# Patient Record
Sex: FEMALE | Race: WHITE | Employment: OTHER | URBAN - METROPOLITAN AREA
[De-identification: names, ages, dates, MRNs, and addresses within clinical notes are randomized per-mention and may not be internally consistent; named-entity substitution may affect disease eponyms.]

---

## 2019-07-23 ENCOUNTER — HOSPITAL ENCOUNTER (OUTPATIENT)
Dept: PET IMAGING | Age: 74
Discharge: HOME | End: 2019-07-23
Attending: NURSE PRACTITIONER
Payer: MEDICARE

## 2019-07-23 DIAGNOSIS — R91.8 OTHER NONSPECIFIC ABNORMAL FINDING OF LUNG FIELD: ICD-10-CM

## 2019-07-23 PROCEDURE — A9552 F18 FDG: HCPCS

## 2019-07-23 RX ORDER — MINERAL OIL
180 ENEMA (ML) RECTAL
COMMUNITY

## 2019-07-23 RX ORDER — CYCLOBENZAPRINE HCL 5 MG
5 TABLET ORAL
COMMUNITY

## 2019-07-23 RX ORDER — HYDROCODONE BITARTRATE AND ACETAMINOPHEN 5; 300 MG/1; MG/1
TABLET ORAL
COMMUNITY

## 2019-07-23 RX ORDER — BENZOCAINE .13; .15; .5; 2 G/100G; G/100G; G/100G; G/100G
2 GEL ORAL DAILY
COMMUNITY

## 2019-07-23 RX ORDER — AZITHROMYCIN 250 MG/1
250 TABLET, FILM COATED ORAL DAILY
Status: ON HOLD | COMMUNITY
End: 2019-07-31

## 2019-07-23 RX ORDER — ALBUTEROL SULFATE 90 UG/1
2 AEROSOL, METERED RESPIRATORY (INHALATION)
COMMUNITY

## 2019-07-23 NOTE — PROGRESS NOTES
PT aware of NPO status. PT no on anticoags. Takes rare naprosyn but will not take any prior to procedure. PT aware of potential for sedation administration and need for  at discharge. PT aware of arrival time pre procedure. Will arrive at [address] [place] [address] for 1015 procedure. Pt states no questions at this time.

## 2019-07-30 NOTE — PROGRESS NOTES
Patient called with questions and returned call. Discussed plan for tomorrow, location and recovery as patient lost notes she had written.

## 2019-07-31 ENCOUNTER — APPOINTMENT (OUTPATIENT)
Dept: GENERAL RADIOLOGY | Age: 74
End: 2019-07-31
Attending: RADIOLOGY
Payer: MEDICARE

## 2019-07-31 ENCOUNTER — HOSPITAL ENCOUNTER (OUTPATIENT)
Dept: CT IMAGING | Age: 74
Discharge: HOME | End: 2019-07-31
Attending: NURSE PRACTITIONER | Admitting: SPECIALIST
Payer: MEDICARE

## 2019-07-31 VITALS
TEMPERATURE: 97.7 F | BODY MASS INDEX: 19.81 KG/M2 | DIASTOLIC BLOOD PRESSURE: 70 MMHG | WEIGHT: 116 LBS | OXYGEN SATURATION: 97 % | HEART RATE: 84 BPM | RESPIRATION RATE: 16 BRPM | SYSTOLIC BLOOD PRESSURE: 124 MMHG | HEIGHT: 64 IN

## 2019-07-31 DIAGNOSIS — R91.8 OTHER NONSPECIFIC ABNORMAL FINDING OF LUNG FIELD: ICD-10-CM

## 2019-07-31 LAB
ANION GAP SERPL CALC-SCNC: 9 MMOL/L (ref 3–18)
APTT PPP: 24.8 SEC (ref 23–36.4)
BASOPHILS # BLD: 0.1 K/UL (ref 0–0.1)
BASOPHILS NFR BLD: 1 % (ref 0–2)
BUN SERPL-MCNC: 16 MG/DL (ref 7–18)
BUN/CREAT SERPL: 27 (ref 12–20)
CALCIUM SERPL-MCNC: 9.2 MG/DL (ref 8.5–10.1)
CHLORIDE SERPL-SCNC: 107 MMOL/L (ref 100–111)
CO2 SERPL-SCNC: 27 MMOL/L (ref 21–32)
CREAT SERPL-MCNC: 0.59 MG/DL (ref 0.6–1.3)
DIFFERENTIAL METHOD BLD: NORMAL
EOSINOPHIL # BLD: 0.1 K/UL (ref 0–0.4)
EOSINOPHIL NFR BLD: 1 % (ref 0–5)
ERYTHROCYTE [DISTWIDTH] IN BLOOD BY AUTOMATED COUNT: 14.3 % (ref 11.6–14.5)
GLUCOSE SERPL-MCNC: 108 MG/DL (ref 74–99)
HCT VFR BLD AUTO: 43.9 % (ref 35–45)
HGB BLD-MCNC: 14.5 G/DL (ref 12–16)
INR PPP: 0.9 (ref 0.8–1.2)
LYMPHOCYTES # BLD: 2 K/UL (ref 0.9–3.6)
LYMPHOCYTES NFR BLD: 24 % (ref 21–52)
MCH RBC QN AUTO: 31.8 PG (ref 24–34)
MCHC RBC AUTO-ENTMCNC: 33 G/DL (ref 31–37)
MCV RBC AUTO: 96.3 FL (ref 74–97)
MONOCYTES # BLD: 0.7 K/UL (ref 0.05–1.2)
MONOCYTES NFR BLD: 9 % (ref 3–10)
NEUTS SEG # BLD: 5.4 K/UL (ref 1.8–8)
NEUTS SEG NFR BLD: 65 % (ref 40–73)
PLATELET # BLD AUTO: 299 K/UL (ref 135–420)
PMV BLD AUTO: 9.7 FL (ref 9.2–11.8)
POTASSIUM SERPL-SCNC: 3.6 MMOL/L (ref 3.5–5.5)
PROTHROMBIN TIME: 12.1 SEC (ref 11.5–15.2)
RBC # BLD AUTO: 4.56 M/UL (ref 4.2–5.3)
SODIUM SERPL-SCNC: 143 MMOL/L (ref 136–145)
WBC # BLD AUTO: 8.3 K/UL (ref 4.6–13.2)

## 2019-07-31 PROCEDURE — 88360 TUMOR IMMUNOHISTOCHEM/MANUAL: CPT

## 2019-07-31 PROCEDURE — 74011250636 HC RX REV CODE- 250/636: Performed by: RADIOLOGY

## 2019-07-31 PROCEDURE — 99153 MOD SED SAME PHYS/QHP EA: CPT

## 2019-07-31 PROCEDURE — 88341 IMHCHEM/IMCYTCHM EA ADD ANTB: CPT

## 2019-07-31 PROCEDURE — 88305 TISSUE EXAM BY PATHOLOGIST: CPT

## 2019-07-31 PROCEDURE — 88334 PATH CONSLTJ SURG CYTO XM EA: CPT

## 2019-07-31 PROCEDURE — 71045 X-RAY EXAM CHEST 1 VIEW: CPT

## 2019-07-31 PROCEDURE — 74011250636 HC RX REV CODE- 250/636

## 2019-07-31 PROCEDURE — 88374 M/PHMTRC ALYS ISHQUANT/SEMIQ: CPT

## 2019-07-31 PROCEDURE — 88377 M/PHMTRC ALYS ISHQUANT/SEMIQ: CPT

## 2019-07-31 PROCEDURE — 88342 IMHCHEM/IMCYTCHM 1ST ANTB: CPT

## 2019-07-31 PROCEDURE — 80048 BASIC METABOLIC PNL TOTAL CA: CPT

## 2019-07-31 PROCEDURE — 88333 PATH CONSLTJ SURG CYTO XM 1: CPT

## 2019-07-31 PROCEDURE — 85025 COMPLETE CBC W/AUTO DIFF WBC: CPT

## 2019-07-31 PROCEDURE — 85610 PROTHROMBIN TIME: CPT

## 2019-07-31 PROCEDURE — 99152 MOD SED SAME PHYS/QHP 5/>YRS: CPT

## 2019-07-31 PROCEDURE — 85730 THROMBOPLASTIN TIME PARTIAL: CPT

## 2019-07-31 PROCEDURE — 32405 CT BX LUNG LT: CPT

## 2019-07-31 RX ORDER — FLUMAZENIL 0.1 MG/ML
0.2 INJECTION INTRAVENOUS
Status: DISCONTINUED | OUTPATIENT
Start: 2019-07-31 | End: 2019-07-31 | Stop reason: HOSPADM

## 2019-07-31 RX ORDER — LIDOCAINE HYDROCHLORIDE 10 MG/ML
1-20 INJECTION INFILTRATION; PERINEURAL
Status: DISCONTINUED | OUTPATIENT
Start: 2019-07-31 | End: 2019-07-31 | Stop reason: HOSPADM

## 2019-07-31 RX ORDER — FLUMAZENIL 0.1 MG/ML
INJECTION INTRAVENOUS
Status: DISCONTINUED
Start: 2019-07-31 | End: 2019-07-31 | Stop reason: WASHOUT

## 2019-07-31 RX ORDER — MIDAZOLAM HYDROCHLORIDE 1 MG/ML
.5-4 INJECTION, SOLUTION INTRAMUSCULAR; INTRAVENOUS
Status: DISCONTINUED | OUTPATIENT
Start: 2019-07-31 | End: 2019-07-31 | Stop reason: HOSPADM

## 2019-07-31 RX ORDER — FENTANYL CITRATE 50 UG/ML
INJECTION, SOLUTION INTRAMUSCULAR; INTRAVENOUS
Status: COMPLETED
Start: 2019-07-31 | End: 2019-07-31

## 2019-07-31 RX ORDER — LIDOCAINE HYDROCHLORIDE 10 MG/ML
INJECTION INFILTRATION; PERINEURAL
Status: DISCONTINUED
Start: 2019-07-31 | End: 2019-07-31 | Stop reason: HOSPADM

## 2019-07-31 RX ORDER — NALOXONE HYDROCHLORIDE 0.4 MG/ML
INJECTION, SOLUTION INTRAMUSCULAR; INTRAVENOUS; SUBCUTANEOUS
Status: DISCONTINUED
Start: 2019-07-31 | End: 2019-07-31 | Stop reason: WASHOUT

## 2019-07-31 RX ORDER — MIDAZOLAM HYDROCHLORIDE 1 MG/ML
INJECTION, SOLUTION INTRAMUSCULAR; INTRAVENOUS
Status: COMPLETED
Start: 2019-07-31 | End: 2019-07-31

## 2019-07-31 RX ORDER — FENTANYL CITRATE 50 UG/ML
25-200 INJECTION, SOLUTION INTRAMUSCULAR; INTRAVENOUS
Status: DISCONTINUED | OUTPATIENT
Start: 2019-07-31 | End: 2019-07-31 | Stop reason: HOSPADM

## 2019-07-31 RX ORDER — SODIUM CHLORIDE 9 MG/ML
25 INJECTION, SOLUTION INTRAVENOUS CONTINUOUS
Status: DISCONTINUED | OUTPATIENT
Start: 2019-07-31 | End: 2019-07-31 | Stop reason: HOSPADM

## 2019-07-31 RX ORDER — NALOXONE HYDROCHLORIDE 0.4 MG/ML
0.1 INJECTION, SOLUTION INTRAMUSCULAR; INTRAVENOUS; SUBCUTANEOUS AS NEEDED
Status: DISCONTINUED | OUTPATIENT
Start: 2019-07-31 | End: 2019-07-31 | Stop reason: HOSPADM

## 2019-07-31 RX ADMIN — MIDAZOLAM HYDROCHLORIDE 1 MG: 1 INJECTION, SOLUTION INTRAMUSCULAR; INTRAVENOUS at 10:54

## 2019-07-31 RX ADMIN — MIDAZOLAM HYDROCHLORIDE 0.5 MG: 1 INJECTION, SOLUTION INTRAMUSCULAR; INTRAVENOUS at 11:08

## 2019-07-31 RX ADMIN — LIDOCAINE HYDROCHLORIDE 5 ML: 10 INJECTION, SOLUTION INFILTRATION; PERINEURAL at 11:19

## 2019-07-31 RX ADMIN — MIDAZOLAM HYDROCHLORIDE 0.5 MG: 1 INJECTION, SOLUTION INTRAMUSCULAR; INTRAVENOUS at 11:13

## 2019-07-31 RX ADMIN — FENTANYL CITRATE 50 MCG: 50 INJECTION, SOLUTION INTRAMUSCULAR; INTRAVENOUS at 10:54

## 2019-07-31 RX ADMIN — FENTANYL CITRATE 25 MCG: 50 INJECTION, SOLUTION INTRAMUSCULAR; INTRAVENOUS at 11:08

## 2019-07-31 RX ADMIN — FENTANYL CITRATE 25 MCG: 50 INJECTION, SOLUTION INTRAMUSCULAR; INTRAVENOUS at 11:13

## 2019-07-31 NOTE — PROGRESS NOTES
TRANSFER - OUT REPORT:    Verbal report given to Karina Lynn RN(name) on Roopa Jose  being transferred to Care Unit(unit) for routine progression of care       Report consisted of patients Situation, Background, Assessment and   Recommendations(SBAR). Returned to care unit following chest xray. Information from the following report(s) Kardex, Procedure Summary and MAR was reviewed with the receiving nurse. Lines:   Peripheral IV 07/31/19 Anterior;Distal;Right Forearm (Active)        Opportunity for questions and clarification was provided.       Patient transported with:   Registered Nurse following cxr

## 2019-07-31 NOTE — PROGRESS NOTES
The patient is an appropriate candidate to undergo CT guided left lung biopsy    Patient assessed immediately prior to induction. Anesthesia plan as follows: Moderate sedation    History and Physical update:  H&P was reviewed and the patient was examined. No changes have occurred in the patient's condition since the H&P was completed.     Karthik Cooper MD  Vascular & Interventional Radiology  [facility]  7/31/2019

## 2019-07-31 NOTE — DISCHARGE INSTRUCTIONS
Patient Education     DISCHARGE SUMMARY from Nurse    PATIENT INSTRUCTIONS:    After general anesthesia or intravenous sedation, for 24 hours or while taking prescription Narcotics:  · Limit your activities  · Do not drive and operate hazardous machinery  · Do not make important personal or business decisions  · Do  not drink alcoholic beverages  · If you have not urinated within 8 hours after discharge, please contact your surgeon on call. Report the following to your surgeon:  · Excessive pain, swelling, redness or odor of or around the surgical area  · Temperature over 100.5  · Nausea and vomiting lasting longer than 4 hours or if unable to take medications  · Any signs of decreased circulation or nerve impairment to extremity: change in color, persistent  numbness, tingling, coldness or increase pain  · Any questions    What to do at Home:  Recommended activity: Activity as tolerated and no driving for today,     *  Please give a list of your current medications to your Primary Care Provider. *  Please update this list whenever your medications are discontinued, doses are      changed, or new medications (including over-the-counter products) are added. *  Please carry medication information at all times in case of emergency situations. These are general instructions for a healthy lifestyle:    No smoking/ No tobacco products/ Avoid exposure to second hand smoke  Surgeon General's Warning:  Quitting smoking now greatly reduces serious risk to your health.     Obesity, smoking, and sedentary lifestyle greatly increases your risk for illness    A healthy diet, regular physical exercise & weight monitoring are important for maintaining a healthy lifestyle    You may be retaining fluid if you have a history of heart failure or if you experience any of the following symptoms:  Weight gain of 3 pounds or more overnight or 5 pounds in a week, increased swelling in our hands or feet or shortness of breath while lying flat in bed. Please call your doctor as soon as you notice any of these symptoms; do not wait until your next office visit. The discharge information has been reviewed with the patient. The patient verbalized understanding. Discharge medications reviewed with the patient and appropriate educational materials and side effects teaching were provided. ___________________________________________________________________________________________________________________________________     Percutaneous Lung Biopsy: What to Expect at [address] [place] [address]    A percutaneous (say \"per-kew-MARY-nee-us) lung biopsy is a procedure to take a sample (biopsy) of lung tissue. The doctor puts a long needle through your chest wall to get the sample. Another doctor will look at the lung tissue with a microscope to check for infection, cancer, or other lung problems. This procedure is also called a needle biopsy. You may be sore where the doctor made the cut (incision) in your skin and put in the biopsy needle. You may feel some pain in your lung when you take a deep breath. These symptoms usually get better in a few days. If you cough up mucus, there may be streaks of blood in the mucus for the first week after the procedure. You may need to take it easy at home for a day or two after the procedure. For 1 week, try to avoid heavy lifting and strenuous activities. These activities could cause bleeding from the biopsy site. It can take several days to get the results of the biopsy. The doctor or nurse will discuss the results with you. This care sheet gives you a general idea about how long it will take for you to recover. But each person recovers at a different pace. Follow the steps below to feel better as quickly as possible. How can you care for yourself at home? Activity    · Rest when you feel tired. Getting enough sleep will help you recover.     · Try to walk each day.  Start by walking a little more than you did the day before. Bit by bit, increase the amount you walk. Walking boosts blood flow and helps prevent pneumonia and constipation.     · Avoid strenuous activities, such as bicycle riding, jogging, weight lifting, or aerobic exercise, for 1 week or until your doctor says it is okay.     · For 1 week, avoid lifting anything that would make you strain. This may include a child, heavy grocery bags and milk containers, a heavy briefcase or backpack, cat litter or dog food bags, or a vacuum .     · Ask your doctor when you can drive again.     · You may need to take 1 or 2 days off from work. It depends on the type of work you do and how you feel.     · You may shower 1 or 2 days after the procedure, if your doctor says it is okay. Pat the incision dry. Do not take a bath for the first week, or until your doctor tells you it is okay.     · Do not fly in an airplane or dive deeply (such as in scuba diving) until your doctor tells you it is okay. Avoid any situations where there is increased air pressure. Diet    · You can eat your normal diet. If your stomach is upset, try bland, low-fat foods like plain rice, broiled chicken, toast, and yogurt. Medicines    · Your doctor will tell you if and when you can restart your medicines. He or she will also give you instructions about taking any new medicines.     · If you take blood thinners, such as warfarin (Coumadin), clopidogrel (Plavix), or aspirin, be sure to talk to your doctor. He or she will tell you if and when to start taking those medicines again. Make sure that you understand exactly what your doctor wants you to do.     · Be safe with medicines. Take pain medicines exactly as directed. ? If the doctor gave you a prescription medicine for pain, take it as prescribed.   ? If you are not taking a prescription pain medicine, ask your doctor if you can take an over-the-counter medicine.     · If you think your pain medicine is making you sick to your stomach:  ? Take your medicine after meals (unless your doctor has told you not to). ? Ask your doctor for a different pain medicine.     · If your doctor prescribed antibiotics, take them as directed. Do not stop taking them just because you feel better. You need to take the full course of antibiotics. Incision care    · If you have strips of tape on the incision, leave the tape on for a week or until it falls off.     · Wash the area daily with warm, soapy water and pat it dry. Don't use hydrogen peroxide or alcohol, which can slow healing. You may cover the area with a gauze bandage if it weeps or rubs against clothing. Change the bandage every day.     · Keep the area clean and dry. Follow-up care is a key part of your treatment and safety. Be sure to make and go to all appointments, and call your doctor if you are having problems. It's also a good idea to know your test results and keep a list of the medicines you take. When should you call for help? Call 911 anytime you think you may need emergency care. For example, call if:    · You passed out (lost consciousness).     · You have severe trouble breathing.     · You have sudden chest pain and shortness of breath, or you cough up blood.    Call your doctor now or seek immediate medical care if:    · You are sick to your stomach or cannot keep fluids down.     · You have pain that does not get better after you take pain medicine.     · You have a fever over 100°F.     · You have signs of infection, such as:  ? Increased pain, swelling, warmth, or redness. ? Red streaks leading from the incision. ? Pus draining from the incision. ? Swollen lymph nodes in your neck, armpits, or groin.   ? A fever.     · Bright red blood has soaked through the bandage over your incision.     · You cough up a lot more mucus than normal, or the mucus changes color.    Watch closely for changes in your health, and be sure to contact your doctor if you have any problems. Where can you learn more? Go to http://alicia-anuja.info/. Enter A648 in the search box to learn more about \"Percutaneous Lung Biopsy: What to Expect at Home. \"  Current as of: September 5, 2018  Content Version: 12.1  © 8198-4328 [employer], [employer]. Care instructions adapted under license by [employer] (which disclaims liability or warranty for this information). If you have questions about a medical condition or this instruction, always ask your healthcare professional. [place] [address] any warranty or liability for your use of this information.

## 2019-07-31 NOTE — PROGRESS NOTES
Back from procedure. Band-aid to left chest dry & intact. denies pain. Daughter at bedside. 1230 Ate well. [address],  [address] chest xray done. 1445 Assisted to bathroom to void. 1545 pt discharged home in care of daughter via w/c in stable condition. denies pain. band-aid intact to left chest no bleeding or swelling. Denies shortness of breath.

## 2019-07-31 NOTE — PROGRESS NOTES
Vascular & Interventional Radiology Brief Procedure Note    Interventional Radiologist: Telma Magana MD    Assistants: None    Pre-operative Diagnosis:  Left lung mass    Post-operative Diagnosis: Same as pre-op dx    Procedure(s) Performed:  CT guided left lung biopsy    Anesthesia:  Local and Moderate Sedation    Findings:  Multiple 20 G cores    Complications: None    Estimated Blood Loss:  minimal    Tubes and Drains: None    Specimens: processed by path present at procedure    Condition: Good    Disposition:   To recovery    Plan: discharge      Telma Magana MD  [facility]  Vascular & Interventional Radiology  7/31/2019

## 2020-06-02 ENCOUNTER — HOSPITAL ENCOUNTER (OUTPATIENT)
Dept: PET IMAGING | Age: 75
Discharge: HOME | End: 2020-06-02
Attending: SPECIALIST
Payer: MEDICARE

## 2020-06-02 DIAGNOSIS — C34.12 PRIMARY MALIGNANT NEOPLASM OF BRONCHUS OF LEFT UPPER LOBE (HCC): ICD-10-CM

## 2020-06-02 PROCEDURE — A9552 F18 FDG: HCPCS

## 2020-10-13 ENCOUNTER — HOSPITAL ENCOUNTER (OUTPATIENT)
Dept: PET IMAGING | Age: 75
Discharge: HOME | End: 2020-10-13
Attending: PHYSICIAN ASSISTANT
Payer: MEDICARE

## 2020-10-13 DIAGNOSIS — C34.12 PRIMARY MALIGNANT NEOPLASM OF BRONCHUS OF LEFT UPPER LOBE (HCC): ICD-10-CM

## 2020-10-13 PROCEDURE — A9552 F18 FDG: HCPCS

## 2020-12-14 ENCOUNTER — HOSPITAL ENCOUNTER (OUTPATIENT)
Dept: LAB | Age: 75
Discharge: HOME | End: 2020-12-14
Payer: MEDICARE

## 2020-12-14 ENCOUNTER — TRANSCRIBE ORDER (OUTPATIENT)
Dept: REGISTRATION | Age: 75
End: 2020-12-14

## 2020-12-14 DIAGNOSIS — D68.8: ICD-10-CM

## 2020-12-14 DIAGNOSIS — D68.8: Primary | ICD-10-CM

## 2020-12-14 LAB
APTT PPP: 26.9 SEC (ref 23–36.4)
INR PPP: 1.1 (ref 0.8–1.2)
PROTHROMBIN TIME: 13.6 SEC (ref 11.5–15.2)

## 2020-12-14 PROCEDURE — 85730 THROMBOPLASTIN TIME PARTIAL: CPT

## 2020-12-14 PROCEDURE — 85610 PROTHROMBIN TIME: CPT

## 2020-12-14 PROCEDURE — 36415 COLL VENOUS BLD VENIPUNCTURE: CPT

## 2023-06-13 ENCOUNTER — HOME HEALTH ADMISSION (OUTPATIENT)
Age: 78
End: 2023-06-13
Payer: MEDICARE

## 2023-06-16 PROCEDURE — 0221000100 HH NO PAY CLAIM PROCEDURE

## 2023-06-20 ENCOUNTER — HOME CARE VISIT (OUTPATIENT)
Age: 78
End: 2023-06-20

## 2023-06-20 PROCEDURE — G0152 HHCP-SERV OF OT,EA 15 MIN: HCPCS

## 2023-06-21 ENCOUNTER — HOME CARE VISIT (OUTPATIENT)
Age: 78
End: 2023-06-21

## 2023-06-21 VITALS
HEART RATE: 60 BPM | SYSTOLIC BLOOD PRESSURE: 126 MMHG | RESPIRATION RATE: 14 BRPM | DIASTOLIC BLOOD PRESSURE: 81 MMHG | OXYGEN SATURATION: 96 % | TEMPERATURE: 97.5 F

## 2023-06-21 VITALS
RESPIRATION RATE: 16 BRPM | DIASTOLIC BLOOD PRESSURE: 77 MMHG | SYSTOLIC BLOOD PRESSURE: 127 MMHG | TEMPERATURE: 98.5 F | OXYGEN SATURATION: 99 % | HEART RATE: 67 BPM

## 2023-06-21 PROCEDURE — G0156 HHCP-SVS OF AIDE,EA 15 MIN: HCPCS

## 2023-06-21 PROCEDURE — G0157 HHC PT ASSISTANT EA 15: HCPCS

## 2023-06-21 ASSESSMENT — ENCOUNTER SYMPTOMS: PAIN LOCATION - PAIN QUALITY: ACHE

## 2023-06-21 NOTE — HOME HEALTH
Clinical Condition per EPIC:    \"68 y.o. female with PMH of HTN, AAA s/p repair, PVD, BPV, non-small cell lung cancer, COPD, HLD presenting to the ED after ground level fall at home. Patient states that she slipped while walking, and subsequently fell onto her left side. Denies hitting her head, or any loss of consciousness. She states that she remembers the entire experience. Believes that her neuropathy that is a consequence of her previous chemotherapy attributed to this. After falling, she did have immediate left-sided hip pain\". Precaution: Per hospital report on 5/14/23, \"Activity: Weightbearing as tolerated left lower extremity\". Past Medical History:   Closed subcapital fracture of neck of left femur    Chronic obs tructive pulmonary disease    Hyperlipidemia    Hypertensive disorder    Neuropathy    Non-small cell lung cancer    Closed subcapital fracture of left femur <Sequela>    Severe protein-calorie malnutrition      SUBJECTIVE: Pt reports soreness describing quad muscles, stating soreness orignate at L hip and radiates to knee. Pt shares she has neuropathy in B hands/feet secondary to residual effects of cancer treatment. Daughter reports pt has an appointment with PCP on Thursday and orthopedic appointment following week. CAREGIVER INVOLVEMENT: Daughter lives nearby and provides assists with IADL tasks such as meal preparation, managing household tasks such as laundry, vaccum, transporation, etc.   MEDICATION RECONCILIATION: Medication reconciled and no changes. DME ORDERED/RECOMMENDED: N/A    PLOF: Pt lives alone in an senior living apartment. Pt was modified independent with ADLs, IADLs and functional mobility. Pt was able to complete functional mobility without use of an assistive device, however, daughter encouraged pt with use of SPC for balance, however, pt demonstrated inconsistency with use.      EQUIPMENT: Rollator, adaptive equipment (e.g. sock aid, long handled shoe horn,

## 2023-06-21 NOTE — CASE COMMUNICATION
REHAB POTENTIAL: Ms. Makayla Gray is a 77 y.o female who was referred s/p Unspecified intracapsular fracture of left femur, subsequent encounter for closed fracture with routine healing. Pt was previously hospitalized after sustaining a fall within the home. She lives in a first floor apartment in a senior living facility with family nearby for support as needed. Pt was modified independent with ADLs, IADLs and functional mobility/transfers. Pt presents at her highlest functional level at this time. Pt is able to complete ADL routines with use of adpative equipment/device for bathing/dressing and functional transfers. Pt is able to complete bathing routine with minimal/SBA due to preference for daughter to provide pt assists with washing back and safety for completing tub shower transfer in/out of tub. Pt is able to perform dressing with use of adaptive equipment with good  teach back during occupational therapy assessment. Pt is supervision to modified independent with functional transfer/mobility within the home. Pt is modified independent with light IADL tasks such as snack prep, etc, with daughter providing assists with heavier IADL task such as transporation, etc. Pt with no further skilled home health occupational therapy needs at this time, with pt and daughter in agreement. PLAN: 1w1, Eval only .  Discharge with [facility].

## 2023-06-22 ENCOUNTER — HOME CARE VISIT (OUTPATIENT)
Age: 78
End: 2023-06-22

## 2023-06-22 NOTE — HOME HEALTH
SUBJECTIVE: My daughter and I walk every day, outside if the weather is good  CAREGIVER INVOLVEMENT/ASSISTANCE NEEDED FOR: Lives alone with daughter living close by. She can assist with IADLs and driving  . OBJECTIVE:  See interventions. PATIENT EDUCATION PROVIDED THIS VISIT: Complete seated HEP and walk with daughter each day  PATIENT RESPONSE TO EDUCATION PROVIDED: verbalized understanding  PATIENT RESPONSE TO TREATMENT: no pain with gait training or B LE exercises  . ASSESSMENT OF PROGRESS TOWARD GOALS: Patient is progressing towards goals as previously set in plan of care with skilled home health physical therapy services at this time made apparent by improving ambulation distance and therapeutic exercises completion. slight fatigue noted during treatment with seated rest breaks needed throughout. PLAN FOR NEXT VISIT: gait training outside, standing exercises and balance training  THE FOLLOWING DISCHARGE PLANNING WAS DISCUSSED WITH THE PATIENT/CAREGIVER: At this time needs continued skilled home health physical therapy to address deficits, reduce fall risk and to obtain goals previously established per plan of care. 1x1 2x3 1x1 visits are left.

## 2023-06-23 ENCOUNTER — HOME CARE VISIT (OUTPATIENT)
Age: 78
End: 2023-06-23

## 2023-06-23 VITALS
DIASTOLIC BLOOD PRESSURE: 86 MMHG | OXYGEN SATURATION: 97 % | HEART RATE: 96 BPM | SYSTOLIC BLOOD PRESSURE: 163 MMHG | TEMPERATURE: 98.1 F | RESPIRATION RATE: 15 BRPM

## 2023-06-23 PROCEDURE — G0157 HHC PT ASSISTANT EA 15: HCPCS

## 2023-06-23 ASSESSMENT — ENCOUNTER SYMPTOMS: PAIN LOCATION - PAIN QUALITY: ACHE

## 2023-06-23 NOTE — HOME HEALTH
SUBJECTIVE: I washed up this morning and went to the hairdresser this morning. I don't need to wash up now. CAREGIVER INVOLVEMENT/ASSISTANCE NEEDED FOR: Lives alone with daughter living close by  . OBJECTIVE:  See interventions. PATIENT EDUCATION PROVIDED THIS VISIT: Continue with B LE HEP and walk within home with FWW every 2 hours  PATIENT RESPONSE TO EDUCATION PROVIDED: verbalized understanding  PATIENT RESPONSE TO TREATMENT: no hip pain with gait training or B LE exercises. Used rollator for gait training to take pressure off shoulder  . ASSESSMENT OF PROGRESS TOWARD GOALS: Patient is progressing towards goals as previously set in plan of care with skilled home health physical therapy services at this time made apparent by improving ambulation and therapeutic exercises completion. PLAN FOR NEXT VISIT: gait outside on uneven surfaces with rollator  THE FOLLOWING DISCHARGE PLANNING WAS DISCUSSED WITH THE PATIENT/CAREGIVER: At this time needs continued skilled home health physical therapy to address deficits, reduce fall risk and to obtain goals previously established per plan of care. 2x3 [id number] are left.

## 2023-06-26 ENCOUNTER — HOME CARE VISIT (OUTPATIENT)
Age: 78
End: 2023-06-26

## 2023-06-26 PROCEDURE — G0157 HHC PT ASSISTANT EA 15: HCPCS

## 2023-06-27 ENCOUNTER — HOME CARE VISIT (OUTPATIENT)
Age: 78
End: 2023-06-27

## 2023-06-27 VITALS
TEMPERATURE: 98.3 F | RESPIRATION RATE: 14 BRPM | OXYGEN SATURATION: 99 % | DIASTOLIC BLOOD PRESSURE: 76 MMHG | SYSTOLIC BLOOD PRESSURE: 113 MMHG | HEART RATE: 71 BPM

## 2023-06-27 PROCEDURE — G0156 HHCP-SVS OF AIDE,EA 15 MIN: HCPCS

## 2023-06-27 ASSESSMENT — ENCOUNTER SYMPTOMS: PAIN LOCATION - PAIN QUALITY: ACHE

## 2023-06-28 ENCOUNTER — HOME CARE VISIT (OUTPATIENT)
Age: 78
End: 2023-06-28

## 2023-06-28 VITALS
HEART RATE: 80 BPM | OXYGEN SATURATION: 96 % | DIASTOLIC BLOOD PRESSURE: 87 MMHG | TEMPERATURE: 98 F | RESPIRATION RATE: 14 BRPM | SYSTOLIC BLOOD PRESSURE: 130 MMHG

## 2023-06-28 PROCEDURE — G0157 HHC PT ASSISTANT EA 15: HCPCS

## 2023-06-28 ASSESSMENT — ENCOUNTER SYMPTOMS: PAIN LOCATION - PAIN QUALITY: STRETCH

## 2023-06-30 ENCOUNTER — HOME CARE VISIT (OUTPATIENT)
Age: 78
End: 2023-06-30

## 2023-06-30 PROCEDURE — G0156 HHCP-SVS OF AIDE,EA 15 MIN: HCPCS

## 2023-07-03 ENCOUNTER — HOME CARE VISIT (OUTPATIENT)
Age: 78
End: 2023-07-03
Payer: MEDICARE

## 2023-07-03 VITALS
DIASTOLIC BLOOD PRESSURE: 88 MMHG | OXYGEN SATURATION: 98 % | RESPIRATION RATE: 15 BRPM | TEMPERATURE: 98 F | HEART RATE: 84 BPM | SYSTOLIC BLOOD PRESSURE: 126 MMHG

## 2023-07-03 PROCEDURE — G0157 HHC PT ASSISTANT EA 15: HCPCS

## 2023-07-04 ENCOUNTER — HOME CARE VISIT (OUTPATIENT)
Age: 78
End: 2023-07-04
Payer: MEDICARE

## 2023-07-04 PROCEDURE — G0156 HHCP-SVS OF AIDE,EA 15 MIN: HCPCS

## 2023-07-06 ENCOUNTER — HOME CARE VISIT (OUTPATIENT)
Age: 78
End: 2023-07-06
Payer: MEDICARE

## 2023-07-06 VITALS
DIASTOLIC BLOOD PRESSURE: 88 MMHG | TEMPERATURE: 98.6 F | SYSTOLIC BLOOD PRESSURE: 122 MMHG | OXYGEN SATURATION: 97 % | RESPIRATION RATE: 12 BRPM | HEART RATE: 78 BPM

## 2023-07-06 PROCEDURE — G0151 HHCP-SERV OF PT,EA 15 MIN: HCPCS

## 2023-07-06 PROCEDURE — G0156 HHCP-SVS OF AIDE,EA 15 MIN: HCPCS

## 2023-07-06 ASSESSMENT — ENCOUNTER SYMPTOMS: PAIN LOCATION - PAIN QUALITY: ACHE

## 2023-07-07 ENCOUNTER — HOME CARE VISIT (OUTPATIENT)
Age: 78
End: 2023-07-07
Payer: MEDICARE

## 2023-07-07 NOTE — HOME HEALTH
S: Patient stated she thinks she is doing very well. Her daughter was present during evaluation and stated she is doing a lot better but would like to continue the therapy to work on car transfers and walking with a cane. O: PAIN: see pain tab   WOUND: L hip incisions healed   ROM: no impairments noted   STRENGTH:Patient demonstrates decreased muscle strength as follows:  R hip flex 4/5  R hip abd 4/5  R hip add 4/5  R knee flex 4/5  R knee ext 4/5  R ankle DF 4/5  L hip flex 4-/5  L hip abd 4-/5  L hip add 4-/5  L knee flex 4/5  L knee ext 4/5  L ankle DF 4/5  FTSTS goal met 12 seconds with BUE assistance from chair   BED MOBILITY: independent, goal met   EQUIPMENT: FWW, 4WW, SPC, shower bench  TRANSFERS: MI with home transfers. SBA for car transfers with VC's for hand placement. Patient would benefit from additional transfer training. GAIT: MI with FWW indoors and supervision outdoors with 4WW. SBA for SPC in the home this visit with VC's for sequencing and cane adjusted to proper height. STEPS: SBA x 1 curb with VC's for lifting 4WW and sequencing   BALANCE: Tinetti 24/28 and TUG 18 seconds with FWW, new goals updated for testing with [facility]   A:ASSESSMENT AND PROGRESS TOWARD GOALS:  Patient demonstrated a positive result to therapy this date as evidenced by an improvement in gait pattern with cues, an understanding of her fall risk and agreement to not walk alone with her cane at this time,  and patient expressing an understanding of the rehab plan due to therapy verbal and written instructions. Goals updated for increased independence in the home, safe mobility in the home, improvement in strength and balance all designed to reduce fall risk and progress toward independence.   Patient will benefit from continued PT intervention to progress toward meeting all established goals     P: 2W1, 1W1 to meet updated goals in POC

## 2023-07-10 ENCOUNTER — HOME CARE VISIT (OUTPATIENT)
Age: 78
End: 2023-07-10
Payer: MEDICARE

## 2023-07-10 VITALS
HEART RATE: 96 BPM | TEMPERATURE: 98.2 F | SYSTOLIC BLOOD PRESSURE: 145 MMHG | RESPIRATION RATE: 16 BRPM | DIASTOLIC BLOOD PRESSURE: 65 MMHG | OXYGEN SATURATION: 96 %

## 2023-07-10 PROCEDURE — G0157 HHC PT ASSISTANT EA 15: HCPCS

## 2023-07-10 ASSESSMENT — ENCOUNTER SYMPTOMS: PAIN LOCATION - PAIN QUALITY: ACHE

## 2023-07-11 ENCOUNTER — HOME CARE VISIT (OUTPATIENT)
Age: 78
End: 2023-07-11
Payer: MEDICARE

## 2023-07-11 PROCEDURE — G0156 HHCP-SVS OF AIDE,EA 15 MIN: HCPCS

## 2023-07-11 ASSESSMENT — ENCOUNTER SYMPTOMS: HEMOPTYSIS: 0

## 2023-07-12 ENCOUNTER — HOME CARE VISIT (OUTPATIENT)
Age: 78
End: 2023-07-12
Payer: MEDICARE

## 2023-07-12 VITALS
SYSTOLIC BLOOD PRESSURE: 95 MMHG | DIASTOLIC BLOOD PRESSURE: 75 MMHG | OXYGEN SATURATION: 98 % | TEMPERATURE: 98.2 F | RESPIRATION RATE: 16 BRPM | HEART RATE: 75 BPM

## 2023-07-12 PROCEDURE — G0157 HHC PT ASSISTANT EA 15: HCPCS

## 2023-07-17 ENCOUNTER — HOME CARE VISIT (OUTPATIENT)
Age: 78
End: 2023-07-17
Payer: MEDICARE

## 2023-07-17 PROCEDURE — G0151 HHCP-SERV OF PT,EA 15 MIN: HCPCS

## 2023-07-18 VITALS
SYSTOLIC BLOOD PRESSURE: 131 MMHG | TEMPERATURE: 98.2 F | OXYGEN SATURATION: 98 % | HEART RATE: 87 BPM | DIASTOLIC BLOOD PRESSURE: 70 MMHG | RESPIRATION RATE: 12 BRPM

## 2023-07-18 ASSESSMENT — ENCOUNTER SYMPTOMS: PAIN LOCATION - PAIN QUALITY: ACHE

## 2023-07-18 NOTE — CASE COMMUNICATION
Cleveland Mccormick received skilled PT and RN s/p Closed subcapital fracture of neck of left femur S/P Nailing 6/15/23. This patient has currently met all goals and has been discharged to a HEP under the care and supervision of her daughter and PCP at this time. The following goals have been met: TUG 17 seconds, FTSTS 14 seconds, patient is ambulating >750ft with a SPC MI and is MI with car transfers and stairs. She has been instructed in a [facility] HEP to include walking daily for exercises. Patient verbalized understanding of all discharge instructions and is in agreement with discharge this visit.

## 2023-07-18 NOTE — HOME HEALTH
S: Patient stated she was doing very well and has been walking in the bess 3 times a day with her rollator and her thigh muscles have been sore. Patient declined working with therapist on laundry simulation today. Daughter states she plans on continuing doing patient's laundry as long as she would need to. O: PAIN: see pain tab   WOUND: see wound addendum, photo uploaded   ROM: no impairments noted   STRENGTH:Patient demonstrates decreased muscle strength as follows:  R hip flex 4/5  R hip abd 4/5  R hip add 4/5  R knee flex 4/5  R knee ext 4/5  R ankle DF 4/5  L hip flex 4-/5  L hip abd 4-/5  L hip add 4-/5  L knee flex 4/5  L knee ext 4/5  L ankle DF 4/5  FTSTS goal met   BED MOBILITY: independent   EQUIPMENT: SPC, 4WW, FWW, shower bench  TRANSFERS: MI  GAIT: patient using SPC when going out with daugthter and 4WW or FWW in her apt complex for maximum safey. She is MI with all AD and is able to ambulate >750 ft with the [facility] and good reciprocal gait pattern. STEPS: MI with railing and SPC   BALANCE: TUG 17 seconds - patient has been free from falls this episode of care   A:ASSESSMENT AND PROGRESS TOWARD GOALS: Karen Valdez received skilled PT and RN s/p Closed subcapital fracture of neck of left femur S/P Nailing 6/15/23. This patient has currently met all goals and has been discharged to a HEP under the care and supervision of her daughter and PCP at this time. The following goals have been met: TUG 17 seconds, FTSTS 14 seconds, patient is ambulating >750ft with a SPC MI and is MI with car transfers and stairs. She has been instructed in a daily HEP to include walking daily for exercises. Patient verbalized understanding of all discharge instructions and is in agreement with discharge this visit. P:DC      Discharge medication list reconciled with patient and caregiver. Questions regarding medications answered and patient/caregiver advised to refer to MD for any medication questions after discharge.   2.

## 2024-06-29 ENCOUNTER — HOME HEALTH ADMISSION (OUTPATIENT)
Age: 79
End: 2024-06-29
Payer: MEDICARE

## 2024-07-01 ENCOUNTER — HOME CARE VISIT (OUTPATIENT)
Age: 79
End: 2024-07-01

## 2024-07-01 VITALS
RESPIRATION RATE: 16 BRPM | HEART RATE: 59 BPM | DIASTOLIC BLOOD PRESSURE: 68 MMHG | OXYGEN SATURATION: 95 % | TEMPERATURE: 97.3 F | SYSTOLIC BLOOD PRESSURE: 103 MMHG

## 2024-07-01 PROCEDURE — 0221000100 HH NO PAY CLAIM PROCEDURE

## 2024-07-01 PROCEDURE — G0299 HHS/HOSPICE OF RN EA 15 MIN: HCPCS

## 2024-07-01 ASSESSMENT — ENCOUNTER SYMPTOMS
PAIN LOCATION - PAIN QUALITY: INTERMITTENT
DYSPNEA ACTIVITY LEVEL: AFTER AMBULATING 10 - 20 FT

## 2024-07-01 NOTE — HOME HEALTH
Skilled services/Home bound verification:     Skilled Reason for admission/summary of clinical condition:  Patient was recently hospitalized for COPD, requiring observation by a SN for s/s of decomposition or adverse effects resulting from newly prescribed medications.  Skilled observation needed to determine if new medication regimen prescribed requires modifications or other therapeutic interventions considered until pt's clinical condition or treatment has stabilized. . .  This patient is homebound for the following reasons Requires considerable and taxing effort to leave the home , Requires the assistance of 1 or more persons to leave the home  and Only leaves the home for medical reasons or Muslim services and are infrequent and of short duration for other reasons .    Caregiver: relative.  Caregiver assists with Caregiver assists patient with bathing, dressing, bathroom, meal prep and setup, medication management, grocery shopping, household chores, transportation to MD appointment and home exercise program..    Medications reconciled and all medications are available in the home this visit.      The following education was provided regarding medications: traMADol (ULTRAM) 50 MG tablet: Purpose: pain relief                      Precautions/Side Effects/Adverse reactions:  bleeding, black, tarry stools, stomach upset  Medications  are effective at this time.      High risk medication teaching regarding anticoagulants, antiplatelets, antibiotics, antipsychotics, hypoglycemic agents, or opioid/narcotics performed (specify): traMADol (ULTRAM) 50 MG tablet    Maurizio Lawrence MD  notified of any discrepancies/look a like medications/medication interactions NA.     Home health supplies by type and quantity ordered/delivered this visit include: NA    Patient education provided this visit to include:   .X.... Disease Management: COPD teaching, pain management, fall precautions, s/s of infection, deep breathing

## 2024-07-05 ENCOUNTER — HOME CARE VISIT (OUTPATIENT)
Age: 79
End: 2024-07-05

## 2024-07-05 NOTE — CASE COMMUNICATION
Called to schedule PT denise.  CG states they have an appt today and are not available.    She requests a visit on Monday.  Scheduling was informed.

## 2024-07-08 ENCOUNTER — HOME CARE VISIT (OUTPATIENT)
Age: 79
End: 2024-07-08
Payer: MEDICARE

## 2024-07-08 PROCEDURE — G0151 HHCP-SERV OF PT,EA 15 MIN: HCPCS

## 2024-07-08 PROCEDURE — G0299 HHS/HOSPICE OF RN EA 15 MIN: HCPCS

## 2024-07-09 ENCOUNTER — HOME CARE VISIT (OUTPATIENT)
Age: 79
End: 2024-07-09
Payer: MEDICARE

## 2024-07-09 VITALS
SYSTOLIC BLOOD PRESSURE: 111 MMHG | DIASTOLIC BLOOD PRESSURE: 81 MMHG | TEMPERATURE: 97.3 F | HEART RATE: 63 BPM | OXYGEN SATURATION: 99 % | RESPIRATION RATE: 16 BRPM

## 2024-07-09 NOTE — HOME HEALTH
Skilled reason for visit: Patient was recently hospitalized for COPD, requiring observation by a SN for s/s of decomposition or adverse effects resulting from newly prescribed medications.  Skilled observation needed to determine if new medication regimen prescribed requires modifications or other therapeutic interventions considered until pt's clinical condition or treatment has stabilized.  Caregiver involvement:  Patient's caregiver is her daughter. Caregiver assists patient with bathing, dressing, walking, bathroom, meal prep and setup, medication management, grocery shopping, household chores, transportation to MD appointment and home exercise program.  Medications reconciled and all medications are available in the home this visit.  The following education was provided regarding medications, medication interactions, and look alike modifications.  metoprolol tartrate (LOPRESSOR)          Contact Agency or MD with questions.  Medications are effective at this time.  Patient states understanding.    Patient education provided this visit:  .X.... Disease Management: COPD teaching.  See interventions for further teaching  Patient level of understanding of education provided: Pt verbalized understanding of all education and repeated back teaching   Skilled Care Performed this visit: Disease process teaching, medication teaching, physical assessment and monitoring  Patient response to procedure performed: NA  Sharps Education Provided: OFELIA  Goals/teaching progressing. Patient's goal is to regain her strength. Progressing toward goals. Patient has remained free from falls, free from infection; no safety concerns at this time and is ambulating independently.  SN to complete education of patient and patient to follow up with any further questions or concerns with Dr Lawrence  Home exercise program:  Fall risk prevention  Continued need for the following skills: Nursing, PT   Patient and/or caregiver notified and agree to

## 2024-07-10 NOTE — HOME HEALTH
S: Patient stated she was getting dizzy mostly when up and walking but not all the time. Her daughter reports she is back to her baseline level of function.   O: PMH/Summary of clinical condition: Iza Hawk is an 79 y.o. female referred with a dx of COPD and BPPV. PMH: HTN, HLD, lung cancer, DM2, HLD osteoporosis, AAA repair and L hip fracture.   Medications reconciled. No changes in medications at this time. Medications are effective at this time.  Social history/ caregivers: patient lives in an ILF and her daughter is very attentive and assists as needed with ADL's, IADL's, medication management and transportation.   PAIN: see pain tab   WOUND:no open wounds noted or reported.   ROM: no impairments noted   STRENGTH:Patient demonstrates decreased muscle strength as follows:  R hip flex 4/5  R hip abd 4/5  R hip add 4/5  R knee flex 4/5  R knee ext 4/5  R ankle DF 4/5  L hip flex 4/5  L hip abd 4/5  L hip add 4/5  L knee flex 4/5  L knee ext 4/5  L ankle DF 4/5  FTSTS 23 seconds with no assistance   BED MOBILITY: independent   EQUIPMENT: 4WW, FWW, quad cane, WC, shower chair  TRANSFERS: patient is MI with transfers in the home  GAIT: MI with 4WW and good reciprocal gait pattern. Daughter states she is able to walk very long distances in the [facility] hallways and is not limited in outtings.  STEPS: N/A   BALANCE: Tinetti 22/28 and TUG 28 seconds - patient has not had any recent falls and is adherent with use of an AD at all times due to fall risk.    A:ASSESSMENT AND PROGRESS TOWARD GOALS:  Patient is currently at her baseline after rehab and does not require home PT. I recommended that she discuss outpatient vestibular or higher level balance rehab with ENT during her appointment this week. Patient and daughter in agreement with plan and evaluation only.    P: 1W1

## 2024-07-10 NOTE — CASE COMMUNICATION
PMH/Summary of clinical condition: Iza Hawk is an 79 y.o. female referred with a dx of COPD and BPPV. PMH: HTN, HLD, lung cancer, DM2, HLD osteoporosis, AAA repair and L hip fracture.   She reports a significant improvement in her dizziness and is at her current baseline level of function per her daughter. I have seen this patient in the past as well after her hip fracture and she does appear to be functioning at her baseline. I laura mmended she discuss outpatient PT for BPPV with her ENT follow up. Plan: evaluation only.

## 2024-07-15 ENCOUNTER — HOME CARE VISIT (OUTPATIENT)
Age: 79
End: 2024-07-15
Payer: MEDICARE

## 2024-07-15 PROCEDURE — G0299 HHS/HOSPICE OF RN EA 15 MIN: HCPCS

## 2024-07-16 VITALS
TEMPERATURE: 96.9 F | SYSTOLIC BLOOD PRESSURE: 104 MMHG | DIASTOLIC BLOOD PRESSURE: 77 MMHG | OXYGEN SATURATION: 95 % | HEART RATE: 69 BPM | RESPIRATION RATE: 19 BRPM

## 2024-07-16 ASSESSMENT — ENCOUNTER SYMPTOMS: DYSPNEA ACTIVITY LEVEL: AFTER AMBULATING MORE THAN 20 FT

## 2024-07-16 NOTE — HOME HEALTH
Skilled reason for visit: [facility] discharge     Caregiver involvement:  assist with ADL's, medications, meal preparation, transporation to MD appointments.      Medications reviewed and all medications are available in the home this visit.    The following education was provided regarding medications:  This drug works better if you take it on an empty stomach. You may take this drug with food if it causes an upset stomach. Some foods like eggs, whole grain breads, cereal, dairy products, coffee, and tea may make this drug not work as well. If this drug causes an upset stomach, talk with your doctor about the best way to take this drug with food. If antacids are used, they may need to be taken at some other time than this drug. Talk with your doctor or pharmacist         MD notified of any discrepancies/look a-like medications/medication interactions: n/a  Medications are effective at this time.      Home health supplies by type and quantity ordered/delivered this visit include: n/a    Patient education provided this visit: medication teaching   safety and fall prevention education   nutrition education   skin care and assessment   pain management        Sharps education provided:n/a    Patient level of understanding of education provided: Patient/Caregiver response to education: Verbalized understanding         Patient response to procedure performed: pt and cg agree to discharge.    Agency Progress toward goals: goal met    Patient's Progress towards personal goals: goal met    Home exercise program continue ambulation with walker for safe ambulation to prevent falls      Continued need for the following skills: pt disharged     Plan for next visit: pt discharged     Patient and/or caregiver notified and agrees to changes in the Plan of Care: N/A.     The following discharge planning was discussed with the pt/caregiver:  pt discharged

## 2024-07-22 ENCOUNTER — HOME CARE VISIT (OUTPATIENT)
Age: 79
End: 2024-07-22
Payer: MEDICARE